# Patient Record
Sex: FEMALE | Race: WHITE | NOT HISPANIC OR LATINO | URBAN - METROPOLITAN AREA
[De-identification: names, ages, dates, MRNs, and addresses within clinical notes are randomized per-mention and may not be internally consistent; named-entity substitution may affect disease eponyms.]

---

## 2017-02-02 ENCOUNTER — EMERGENCY (EMERGENCY)
Facility: HOSPITAL | Age: 77
LOS: 1 days | Discharge: HOME | End: 2017-02-02
Attending: EMERGENCY MEDICINE | Admitting: EMERGENCY MEDICINE
Payer: MEDICARE

## 2017-02-02 VITALS
OXYGEN SATURATION: 98 % | HEART RATE: 54 BPM | SYSTOLIC BLOOD PRESSURE: 103 MMHG | DIASTOLIC BLOOD PRESSURE: 78 MMHG | TEMPERATURE: 97 F | WEIGHT: 113.98 LBS | RESPIRATION RATE: 15 BRPM

## 2017-02-02 DIAGNOSIS — I10 ESSENTIAL (PRIMARY) HYPERTENSION: ICD-10-CM

## 2017-02-02 DIAGNOSIS — R55 SYNCOPE AND COLLAPSE: ICD-10-CM

## 2017-02-02 DIAGNOSIS — D72.829 ELEVATED WHITE BLOOD CELL COUNT, UNSPECIFIED: ICD-10-CM

## 2017-02-02 DIAGNOSIS — Z72.0 TOBACCO USE: ICD-10-CM

## 2017-02-02 DIAGNOSIS — E86.0 DEHYDRATION: ICD-10-CM

## 2017-02-02 LAB
ALBUMIN SERPL ELPH-MCNC: 3.4 G/DL — SIGNIFICANT CHANGE UP (ref 3.3–5)
ALP SERPL-CCNC: 91 U/L — SIGNIFICANT CHANGE UP (ref 40–120)
ALT FLD-CCNC: 29 U/L — SIGNIFICANT CHANGE UP (ref 12–78)
ANION GAP SERPL CALC-SCNC: 10 MMOL/L — SIGNIFICANT CHANGE UP (ref 5–17)
ANISOCYTOSIS BLD QL: SLIGHT — SIGNIFICANT CHANGE UP
APPEARANCE UR: CLEAR — SIGNIFICANT CHANGE UP
AST SERPL-CCNC: 24 U/L — SIGNIFICANT CHANGE UP (ref 15–37)
BACTERIA # UR AUTO: (no result)
BASO STIPL BLD QL SMEAR: SLIGHT — SIGNIFICANT CHANGE UP
BASOPHILS # BLD AUTO: 0.3 K/UL — HIGH (ref 0–0.2)
BILIRUB SERPL-MCNC: 0.4 MG/DL — SIGNIFICANT CHANGE UP (ref 0.2–1.2)
BILIRUB UR-MCNC: NEGATIVE — SIGNIFICANT CHANGE UP
BUN SERPL-MCNC: 40 MG/DL — HIGH (ref 7–23)
CALCIUM SERPL-MCNC: 9.1 MG/DL — SIGNIFICANT CHANGE UP (ref 8.5–10.1)
CHLORIDE SERPL-SCNC: 103 MMOL/L — SIGNIFICANT CHANGE UP (ref 96–108)
CK SERPL-CCNC: 61 U/L — SIGNIFICANT CHANGE UP (ref 26–192)
CO2 SERPL-SCNC: 23 MMOL/L — SIGNIFICANT CHANGE UP (ref 22–31)
COLOR SPEC: YELLOW — SIGNIFICANT CHANGE UP
CREAT SERPL-MCNC: 1.42 MG/DL — HIGH (ref 0.5–1.3)
DIFF PNL FLD: (no result)
EOSINOPHIL # BLD AUTO: 0.3 K/UL — SIGNIFICANT CHANGE UP (ref 0–0.5)
EOSINOPHIL NFR BLD AUTO: 1 % — SIGNIFICANT CHANGE UP (ref 0–6)
EPI CELLS # UR: SIGNIFICANT CHANGE UP
GLUCOSE SERPL-MCNC: 121 MG/DL — HIGH (ref 70–99)
GLUCOSE UR QL: NEGATIVE MG/DL — SIGNIFICANT CHANGE UP
HCT VFR BLD CALC: 39.6 % — SIGNIFICANT CHANGE UP (ref 34.5–45)
HGB BLD-MCNC: 13.3 G/DL — SIGNIFICANT CHANGE UP (ref 11.5–15.5)
KETONES UR-MCNC: NEGATIVE — SIGNIFICANT CHANGE UP
LACTATE SERPL-SCNC: 0.7 MMOL/L — SIGNIFICANT CHANGE UP (ref 0.7–2)
LEUKOCYTE ESTERASE UR-ACNC: NEGATIVE — SIGNIFICANT CHANGE UP
LYMPHOCYTES # BLD AUTO: 16 % — SIGNIFICANT CHANGE UP (ref 13–44)
LYMPHOCYTES # BLD AUTO: 2.3 K/UL — SIGNIFICANT CHANGE UP (ref 1–3.3)
MAGNESIUM SERPL-MCNC: 2.2 MG/DL — SIGNIFICANT CHANGE UP (ref 1.8–2.4)
MANUAL DIF COMMENT BLD-IMP: SIGNIFICANT CHANGE UP
MCHC RBC-ENTMCNC: 30 PG — SIGNIFICANT CHANGE UP (ref 27–34)
MCHC RBC-ENTMCNC: 33.6 GM/DL — SIGNIFICANT CHANGE UP (ref 32–36)
MCV RBC AUTO: 89.4 FL — SIGNIFICANT CHANGE UP (ref 80–100)
MONOCYTES # BLD AUTO: 1.9 K/UL — HIGH (ref 0–0.9)
MONOCYTES NFR BLD AUTO: 11 % — SIGNIFICANT CHANGE UP (ref 2–14)
NEUTROPHILS # BLD AUTO: 17 K/UL — HIGH (ref 1.8–7.4)
NEUTROPHILS NFR BLD AUTO: 70 % — SIGNIFICANT CHANGE UP (ref 43–77)
NITRITE UR-MCNC: NEGATIVE — SIGNIFICANT CHANGE UP
PH UR: 6.5 — SIGNIFICANT CHANGE UP (ref 4.8–8)
PLAT MORPH BLD: NORMAL — SIGNIFICANT CHANGE UP
PLATELET # BLD AUTO: 320 K/UL — SIGNIFICANT CHANGE UP (ref 150–400)
POIKILOCYTOSIS BLD QL AUTO: SLIGHT — SIGNIFICANT CHANGE UP
POLYCHROMASIA BLD QL SMEAR: SLIGHT — SIGNIFICANT CHANGE UP
POTASSIUM SERPL-MCNC: 3.8 MMOL/L — SIGNIFICANT CHANGE UP (ref 3.5–5.3)
POTASSIUM SERPL-SCNC: 3.8 MMOL/L — SIGNIFICANT CHANGE UP (ref 3.5–5.3)
PROT SERPL-MCNC: 7.9 GM/DL — SIGNIFICANT CHANGE UP (ref 6–8.3)
PROT UR-MCNC: NEGATIVE MG/DL — SIGNIFICANT CHANGE UP
RAPID RVP RESULT: SIGNIFICANT CHANGE UP
RBC # BLD: 4.43 M/UL — SIGNIFICANT CHANGE UP (ref 3.8–5.2)
RBC # FLD: 12.8 % — SIGNIFICANT CHANGE UP (ref 10.3–14.5)
RBC BLD AUTO: SIGNIFICANT CHANGE UP
RBC CASTS # UR COMP ASSIST: SIGNIFICANT CHANGE UP /HPF (ref 0–4)
SODIUM SERPL-SCNC: 136 MMOL/L — SIGNIFICANT CHANGE UP (ref 135–145)
SP GR SPEC: 1 — LOW (ref 1.01–1.02)
TROPONIN I SERPL-MCNC: <0.015 NG/ML — SIGNIFICANT CHANGE UP (ref 0.01–0.04)
UROBILINOGEN FLD QL: NEGATIVE MG/DL — SIGNIFICANT CHANGE UP
VARIANT LYMPHS # BLD: 2 % — SIGNIFICANT CHANGE UP (ref 0–6)
WBC # BLD: 21.7 K/UL — HIGH (ref 3.8–10.5)
WBC # FLD AUTO: 21.7 K/UL — HIGH (ref 3.8–10.5)
WBC UR QL: NEGATIVE — SIGNIFICANT CHANGE UP

## 2017-02-02 PROCEDURE — 99285 EMERGENCY DEPT VISIT HI MDM: CPT

## 2017-02-02 PROCEDURE — 71010: CPT | Mod: 26

## 2017-02-02 RX ORDER — SODIUM CHLORIDE 9 MG/ML
1000 INJECTION INTRAMUSCULAR; INTRAVENOUS; SUBCUTANEOUS ONCE
Qty: 0 | Refills: 0 | Status: DISCONTINUED | OUTPATIENT
Start: 2017-02-02 | End: 2017-02-06

## 2017-02-02 RX ORDER — AZITHROMYCIN 500 MG/1
1 TABLET, FILM COATED ORAL
Qty: 5 | Refills: 0
Start: 2017-02-02 | End: 2017-02-07

## 2017-02-02 RX ORDER — SODIUM CHLORIDE 9 MG/ML
1000 INJECTION INTRAMUSCULAR; INTRAVENOUS; SUBCUTANEOUS ONCE
Qty: 0 | Refills: 0 | Status: COMPLETED | OUTPATIENT
Start: 2017-02-02 | End: 2017-02-02

## 2017-02-02 RX ADMIN — SODIUM CHLORIDE 1000 MILLILITER(S): 9 INJECTION INTRAMUSCULAR; INTRAVENOUS; SUBCUTANEOUS at 17:20

## 2017-02-02 NOTE — ED PROVIDER NOTE - CARE PLAN
Principal Discharge DX:	Syncope, unspecified syncope type  Secondary Diagnosis:	Dehydration  Secondary Diagnosis:	Leukocytosis, unspecified type

## 2017-02-02 NOTE — ED PROVIDER NOTE - CONDUCTED A DETAILED DISCUSSION WITH PATIENT AND/OR GUARDIAN REGARDING, MDM
lab results/radiology results/need for outpatient follow-up/need to admit/return to ED if symptoms worsen, persist or questions arise

## 2017-02-02 NOTE — ED PROVIDER NOTE - DETAILS:
I, Charly Breen, performed the initial face to face bedside interview with this patient regarding history of present illness, review of symptoms and relevant past medical, social and family history.  I completed an independent physical examination.  I was the initial provider who evaluated this patient.  The history, relevant review of systems, past medical and surgical history, medical decision making, and physical examination was documented by the scribe in my presence and I attest to the accuracy of the documentation.

## 2017-02-02 NOTE — ED PROVIDER NOTE - MEDICAL DECISION MAKING DETAILS
PLAN Syncope work up, orthostatics, cxray, ekg, fluids and cardiac monitor. No chest pain or cardiac hx to suggest cardiac etiology today.

## 2017-02-02 NOTE — ED PROVIDER NOTE - PROGRESS NOTE DETAILS
Pt with jacket on and wants to be discharged.  Will not stay for more fluids and admission for syncope.  Aware of risks.  Given copy of labs.  Increase PO hydration.  Must f/u with PMD in 10-2 days for repeat labs.  TESHA - mild, dehydration, and leukocytosis Pt with jacket on and wants to be discharged.  Will not stay for more fluids and admission for syncope.  Aware of risks.  Given copy of labs.  Increase PO hydration.  Must f/u with PMD in 10-2 days for repeat labs.  TESHA - mild, dehydration, and leukocytosis.    No respiratory symptoms, more URI.  WIll give Azithro given questionable area in RLL field. Pt with jacket on and wants to be discharged.  Will not stay for more fluids and admission for syncope.  I have recommended that she stay given her syncope, mild TESHA (suggesting Dehydration) and elevated WBC count.  Pt says she will not stay and will follow up with doctor.  Competent to make this decision, Aware of risks, daughters with her and are okay taking pt home.  Given copy of labs.  Increase PO hydration.  Must f/u with PMD in 10-2 days for repeat lab.  Discussed she needs to have her WBC and kidney function rechecked...  TESHA - mild, dehydration, and leukocytosis.  WBC may be from viral uri.  CXR shows possible infiltrate so will cover with azithromycin.  MUST RETURN IF SYMPTOMS WORSEN and pt is aware of this.

## 2017-02-02 NOTE — ED PROVIDER NOTE - OBJECTIVE STATEMENT
77 y/o female pmhx of htn, hld, smoker currently on metoprolol, hydrochlorothiazide presents to ED due to a syncopal episode. Pt was at [employer] with her daughter when she began to feel hot and had a syncopal episode. As per daughter pt was unconscious, woke up and had another syncopal episode, all over the course of 1 minute. Pt denies hitting her head, shaking or seizure activity. Daughter states pt was lethargic and sleepy after th episode. Pt states she feels fine now. Positive sick contacts at home.

## 2017-02-02 NOTE — ED PROVIDER NOTE - SKIN, MLM
Skin normal color for race, warm, dry and intact. No evidence of rash. No sign of external trauma such as abrasion or laceration

## 2019-09-26 ENCOUNTER — INPATIENT (INPATIENT)
Facility: HOSPITAL | Age: 79
LOS: 0 days | Discharge: LEFT BEFORE DISCHARGE | DRG: 312 | End: 2019-09-27
Attending: INTERNAL MEDICINE | Admitting: INTERNAL MEDICINE
Payer: MEDICARE

## 2019-09-26 VITALS — WEIGHT: 115.08 LBS | HEIGHT: 63 IN

## 2019-09-26 DIAGNOSIS — R55 SYNCOPE AND COLLAPSE: ICD-10-CM

## 2019-09-26 LAB
ALBUMIN SERPL ELPH-MCNC: 3.3 G/DL — SIGNIFICANT CHANGE UP (ref 3.3–5)
ALP SERPL-CCNC: 73 U/L — SIGNIFICANT CHANGE UP (ref 40–120)
ALT FLD-CCNC: 25 U/L — SIGNIFICANT CHANGE UP (ref 12–78)
ANION GAP SERPL CALC-SCNC: 12 MMOL/L — SIGNIFICANT CHANGE UP (ref 5–17)
AST SERPL-CCNC: 24 U/L — SIGNIFICANT CHANGE UP (ref 15–37)
BASOPHILS # BLD AUTO: 0.06 K/UL — SIGNIFICANT CHANGE UP (ref 0–0.2)
BASOPHILS NFR BLD AUTO: 0.5 % — SIGNIFICANT CHANGE UP (ref 0–2)
BILIRUB SERPL-MCNC: 0.4 MG/DL — SIGNIFICANT CHANGE UP (ref 0.2–1.2)
BUN SERPL-MCNC: 33 MG/DL — HIGH (ref 7–23)
CALCIUM SERPL-MCNC: 9 MG/DL — SIGNIFICANT CHANGE UP (ref 8.5–10.1)
CHLORIDE SERPL-SCNC: 106 MMOL/L — SIGNIFICANT CHANGE UP (ref 96–108)
CO2 SERPL-SCNC: 22 MMOL/L — SIGNIFICANT CHANGE UP (ref 22–31)
CREAT SERPL-MCNC: 1.49 MG/DL — HIGH (ref 0.5–1.3)
EOSINOPHIL # BLD AUTO: 0.4 K/UL — SIGNIFICANT CHANGE UP (ref 0–0.5)
EOSINOPHIL NFR BLD AUTO: 3.5 % — SIGNIFICANT CHANGE UP (ref 0–6)
GLUCOSE SERPL-MCNC: 136 MG/DL — HIGH (ref 70–99)
HCT VFR BLD CALC: 38.8 % — SIGNIFICANT CHANGE UP (ref 34.5–45)
HGB BLD-MCNC: 12.9 G/DL — SIGNIFICANT CHANGE UP (ref 11.5–15.5)
IMM GRANULOCYTES NFR BLD AUTO: 0.3 % — SIGNIFICANT CHANGE UP (ref 0–1.5)
LYMPHOCYTES # BLD AUTO: 3.85 K/UL — HIGH (ref 1–3.3)
LYMPHOCYTES # BLD AUTO: 33.4 % — SIGNIFICANT CHANGE UP (ref 13–44)
MCHC RBC-ENTMCNC: 29.7 PG — SIGNIFICANT CHANGE UP (ref 27–34)
MCHC RBC-ENTMCNC: 33.2 GM/DL — SIGNIFICANT CHANGE UP (ref 32–36)
MCV RBC AUTO: 89.4 FL — SIGNIFICANT CHANGE UP (ref 80–100)
MONOCYTES # BLD AUTO: 1.08 K/UL — HIGH (ref 0–0.9)
MONOCYTES NFR BLD AUTO: 9.4 % — SIGNIFICANT CHANGE UP (ref 2–14)
NEUTROPHILS # BLD AUTO: 6.12 K/UL — SIGNIFICANT CHANGE UP (ref 1.8–7.4)
NEUTROPHILS NFR BLD AUTO: 52.9 % — SIGNIFICANT CHANGE UP (ref 43–77)
PLATELET # BLD AUTO: 268 K/UL — SIGNIFICANT CHANGE UP (ref 150–400)
POTASSIUM SERPL-MCNC: 3.5 MMOL/L — SIGNIFICANT CHANGE UP (ref 3.5–5.3)
POTASSIUM SERPL-SCNC: 3.5 MMOL/L — SIGNIFICANT CHANGE UP (ref 3.5–5.3)
PROT SERPL-MCNC: 7.3 GM/DL — SIGNIFICANT CHANGE UP (ref 6–8.3)
RBC # BLD: 4.34 M/UL — SIGNIFICANT CHANGE UP (ref 3.8–5.2)
RBC # FLD: 13.8 % — SIGNIFICANT CHANGE UP (ref 10.3–14.5)
SODIUM SERPL-SCNC: 140 MMOL/L — SIGNIFICANT CHANGE UP (ref 135–145)
WBC # BLD: 11.54 K/UL — HIGH (ref 3.8–10.5)
WBC # FLD AUTO: 11.54 K/UL — HIGH (ref 3.8–10.5)

## 2019-09-26 PROCEDURE — 80048 BASIC METABOLIC PNL TOTAL CA: CPT

## 2019-09-26 PROCEDURE — 71045 X-RAY EXAM CHEST 1 VIEW: CPT | Mod: 26

## 2019-09-26 PROCEDURE — 95819 EEG AWAKE AND ASLEEP: CPT

## 2019-09-26 PROCEDURE — 93010 ELECTROCARDIOGRAM REPORT: CPT

## 2019-09-26 PROCEDURE — 84484 ASSAY OF TROPONIN QUANT: CPT

## 2019-09-26 PROCEDURE — 36415 COLL VENOUS BLD VENIPUNCTURE: CPT

## 2019-09-26 PROCEDURE — 85027 COMPLETE CBC AUTOMATED: CPT

## 2019-09-26 PROCEDURE — 93312 ECHO TRANSESOPHAGEAL: CPT

## 2019-09-26 RX ORDER — BENAZEPRIL HYDROCHLORIDE 40 MG/1
0 TABLET ORAL
Qty: 90 | Refills: 0 | DISCHARGE

## 2019-09-26 RX ORDER — SODIUM CHLORIDE 9 MG/ML
1000 INJECTION INTRAMUSCULAR; INTRAVENOUS; SUBCUTANEOUS ONCE
Refills: 0 | Status: COMPLETED | OUTPATIENT
Start: 2019-09-26 | End: 2019-09-26

## 2019-09-26 RX ORDER — LABETALOL HCL 100 MG
0 TABLET ORAL
Qty: 360 | Refills: 0 | DISCHARGE

## 2019-09-26 RX ORDER — AMLODIPINE BESYLATE 2.5 MG/1
0 TABLET ORAL
Qty: 90 | Refills: 0 | DISCHARGE

## 2019-09-26 RX ORDER — CHLORTHALIDONE 50 MG
0 TABLET ORAL
Qty: 90 | Refills: 0 | DISCHARGE

## 2019-09-26 RX ADMIN — SODIUM CHLORIDE 1000 MILLILITER(S): 9 INJECTION INTRAMUSCULAR; INTRAVENOUS; SUBCUTANEOUS at 20:56

## 2019-09-26 NOTE — H&P ADULT - ATTENDING COMMENTS
78 year old female patient with transient changes in mentation    -Admit to Telemetry    #Syncope  DDX: vasovagal, orthostatic, arrhythmia, mechanical, neurogenic, medication induced  -monitor on telemetry  -IVF  -will get morning echo  -will get ct head to rule out intracranial pathology  -cardiology consult    # Concern for seizure   -family members witnessed shaking followed by bowel and bladder incontinence  -will get EEG  -neuro consult  -f/u am electrolytes     #Leukocytosis  -likely reactive  -no focal signs of infection  -trend cbc  #TESHA  -unknown baseline  -acute vs acute on chronic  -will get kidney sono  -gentle hydration  #HTN  -stable  -resume home meds  #HLD  -outpatient follow up with PCP    #Advanced Directives  -full code    #DVT ppx  -	ambulation

## 2019-09-26 NOTE — H&P ADULT - NSHPLABSRESULTS_GEN_ALL_CORE
12.9   11.54 )-----------( 268      ( 26 Sep 2019 20:37 )             38.8     26 Sep 2019 20:37    140    |  106    |  33     ----------------------------<  136    3.5     |  22     |  1.49     Ca    9.0        26 Sep 2019 20:37    TPro  7.3    /  Alb  3.3    /  TBili  0.4    /  DBili  x      /  AST  24     /  ALT  25     /  AlkPhos  73     26 Sep 2019 20:37    LIVER FUNCTIONS - ( 26 Sep 2019 20:37 )  Alb: 3.3 g/dL / Pro: 7.3 gm/dL / ALK PHOS: 73 U/L / ALT: 25 U/L / AST: 24 U/L / GGT: x             CAPILLARY BLOOD GLUCOSE        CARDIAC MARKERS ( 26 Sep 2019 20:37 )  <0.015 ng/mL / x     / x     / x     / x          RADIOLOGY

## 2019-09-26 NOTE — ED ADULT NURSE NOTE - OBJECTIVE STATEMENT
pt presents to ED with complaints of AMS and bradycardia. per family, pts medications were changed yesterday by out pt MD. pt started new medications today and took 2 doses per instructions. family called EMS and upon arrival, pt was found to be lethargic, pale, altered and hypotensive. 18 g in place to left AC from EMs. 18 g placed to left FA. labs sent. EKG performed. tele monitoring initiated.

## 2019-09-26 NOTE — H&P ADULT - NSHPREVIEWOFSYSTEMS_GEN_ALL_CORE
REVIEW OF SYSTEMS:  CONSTITUTIONAL: No weakness, fevers or chills  EYES/ENT: No visual changes, no sore throat/ sick contacts  RESPIRATORY: No cough,  No shortness of breath, no sniffling  CARDIOVASCULAR: No chest pain or palpitations  GASTROINTESTINAL: No abdominal or epigastric pain. No nausea, vomiting, or hematemesis; No diarrhea or constipation. No melena or hematochezia.  GENITOURINARY: No dysuria  NEUROLOGICAL: No numbness or weakness  SKIN: No itching, burning, rashes, or lesions   All other review of systems is negative unless indicated above

## 2019-09-26 NOTE — H&P ADULT - HISTORY OF PRESENT ILLNESS
78F w/hx of HTN, CKD, presents to [facility] via EMS with complaints of passing out.  Per pt and her family, 30 minutes after taking her Labetalol she experienced weakness, LOS, shaking, and loss of bladder/bowel function. Pt then came to and then lost consciousness again. When she came to for the second time, she seemed confused. EMS was called. Pt after her LOC had BP 60/30 and HR of 40. Denies any presyncope feelings of lightheadedness, CP/palpitations, HA, changes in vision, SOB, flushing.     Of note, pt visited nephrology for the first time yesterday and had changes in medication. She d/c her HCTZ 50mg qd and Metoprolol tartrate 25mg QID. She was then started on Chlorthalidone 25mg qd and Labetalol 200mg 2tabs BID. Pt states scheduled for renal sono at the end fo the month.     In the ED, pt received 1L bolus, blood work significant for leukocytosis. Cr =1.49. EGFR of 33

## 2019-09-26 NOTE — H&P ADULT - NSHPPHYSICALEXAM_GEN_ALL_CORE
Vital Signs Last 24 Hrs  T(C): 36.8 (26 Sep 2019 20:41), Max: 36.8 (26 Sep 2019 20:41)  T(F): 98.2 (26 Sep 2019 20:41), Max: 98.2 (26 Sep 2019 20:41)  HR: 62 (26 Sep 2019 21:00) (61 - 66)  BP: 137/68 (26 Sep 2019 21:00) (127/37 - 138/113)  RR: 18 (26 Sep 2019 21:00) (18 - 18)  SpO2: 100% (26 Sep 2019 21:00) (100% - 100%)    PHYSICAL EXAM:  Constitutional: NAD, awake and alert, well-developed  HEENT: Normal Hearing, MMM, ?Amblyopia  Neck: Soft and supple, No LAD  Respiratory: Breath sounds are clear bilaterally  Cardiovascular: S1 and S2, regular rate and rhythm  Gastrointestinal: Bowel Sounds present, soft, nontender, nondistended, no guarding, no rebound  Extremities: No peripheral edema  Vascular: 2+ peripheral pulses  Neurological: A/O x 3, no focal deficits  Musculoskeletal: 5/5 strength b/l upper and lower extremities  Skin: Seborrheic keratosis scattered across flank

## 2019-09-26 NOTE — ED PROVIDER NOTE - CLINICAL SUMMARY MEDICAL DECISION MAKING FREE TEXT BOX
77 y/o female with hypotension, new change in medications. Likely medication related. Pt now improved, at baseline. R/o cardiac etiology, EKG, labs.

## 2019-09-26 NOTE — ED PROVIDER NOTE - NS_ ATTENDINGSCRIBEDETAILS _ED_A_ED_FT
Meghna Marcus MD: The history, relevant review of systems, past medical and surgical history, medical decision making, and physical examination was documented by the scribe in my presence and I attest to the accuracy of the documentation.

## 2019-09-26 NOTE — H&P ADULT - ASSESSMENT
78F w/hx of HTN, CKD, presents to [facility] via EMS with complaints of passing out.  Pt w/symptoms concerning for seizure. 78F w/hx of HTN, CKD, presents to [facility] via EMS with complaints of passing out.  Pt w/symptoms concerning for seizure although with recent changes in medication, could attributed to hypoperfusion in the setting of hypotension.    #LOC: syncope vs seizure  - Admit to tele 78F w/hx of HTN, CKD, presents to [facility] via EMS with complaints of passing out.  Pt w/symptoms concerning for seizure although with recent changes in medication, could attributed to hypoperfusion in the setting of hypotension.    #LOC: syncope vs seizure  - Admit to tele  - Serum mag/phos, TSH w/reflex T4  - UA/UTox  - CT head  - Neurology consult: EEG  - ECHO in a.m.  - Cardiology consult  - Seizure/fall precautions  - Since this is pt's first seizure, no AED at this time    #TESHA on CKD : baseline unknown. Last cr 2/2017 1.42  - Gentle hydration  - Hold nephrotoxic drugs    #Leukocytosis  - Could be reactionary, pt is afebrile. Will continue to trend WBC    #HTN  - DASH/TLC Diet  - Continue Amlodipine w/parameters  - Hold Benazepril, Chlorthalidone in the setting of TESHA  - Trend BP    #Current Everyday Smoker  - Nicotine patch  - Counseling provided. Pt adamant about not quitting      #Prophylactic Measure  - SCDs    IMPROVE VTE Individual Risk Assessment    RISK                                                                Points    [  ] Previous VTE                                                  3    [  ] Thrombophilia                                               2    [  ] Lower limb paralysis                                      2        (unable to hold up >15 seconds)      [  ] Current Cancer                                              2         (within 6 months)    [  ] Immobilization > 24 hrs                                1    [  ] ICU/CCU stay > 24 hours                              1    [x  ] Age > 60                                                      1    IMPROVE VTE Score _____1____    IMPROVE Score 0-1: Low Risk, No VTE prophylaxis required for most patients, encourage ambulation.   IMPROVE Score 2-3: At risk, pharmacologic VTE prophylaxis is indicated for most patients (in the absence of a contraindication)  IMPROVE Score > or = 4: High Risk, pharmacologic VTE prophylaxis is indicated for most patients (in the absence of a contraindication) 78F w/hx of HTN, CKD, presents to [facility] via EMS with complaints of passing out.  Pt w/symptoms concerning for seizure although with recent changes in medication, could attributed to hypoperfusion in the setting of hypotension.    #LOC: syncope vs seizure  - Admit to tele  - Serum mag/phos, TSH w/reflex T4  - UA/UTox  - CT head  - Neurology consult: EEG  - ECHO in a.m.  - Cardiology consult  - Seizure/fall precautions  - Since this is pt's first seizure, no AED at this time    #TESHA on CKD : baseline unknown. Last cr 2/2017 1.42  - Gentle hydration  - Hold nephrotoxic drugs  -get kidney sono    #Leukocytosis  - Could be reactionary, pt is afebrile. Will continue to trend WBC    #HTN  - DASH/TLC Diet  - Continue Amlodipine w/parameters  - Hold Benazepril, Chlorthalidone in the setting of TESHA  - Trend BP    #Current Everyday Smoker  - Nicotine patch  - Counseling provided. Pt adamant about not quitting      #Prophylactic Measure  - SCDs    IMPROVE VTE Individual Risk Assessment    RISK                                                                Points    [  ] Previous VTE                                                  3    [  ] Thrombophilia                                               2    [  ] Lower limb paralysis                                      2        (unable to hold up >15 seconds)      [  ] Current Cancer                                              2         (within 6 months)    [  ] Immobilization > 24 hrs                                1    [  ] ICU/CCU stay > 24 hours                              1    [x  ] Age > 60                                                      1    IMPROVE VTE Score _____1____    IMPROVE Score 0-1: Low Risk, No VTE prophylaxis required for most patients, encourage ambulation.   IMPROVE Score 2-3: At risk, pharmacologic VTE prophylaxis is indicated for most patients (in the absence of a contraindication)  IMPROVE Score > or = 4: High Risk, pharmacologic VTE prophylaxis is indicated for most patients (in the absence of a contraindication) 78F w/hx of HTN, CKD, presents to [facility] via EMS with complaints of passing out.  Pt w/symptoms concerning for seizure although with recent changes in medication, could attributed to hypoperfusion in the setting of hypotension.    #LOC: syncope vs seizure  ##Syncope  - Admit to tele  - Orthostatics  - ECHO in a.m.  - Cardiology consult    ##Seizure  - Serum mag/phos, TSH w/reflex T4  - UA/UTox  - CT head  - Neurology consult: EEG  - Seizure/fall precautions  - Since this is pt's first seizure-like activity, no AED at this time    #TESHA on CKD : baseline unknown. Last cr 2/2017 1.42  - Gentle hydration  - Recommend hold nephrotoxic drugs  - get Renal sono    #Leukocytosis  - Could be reactive- pt is afebrile. Will continue to trend WBC    #HTN  - DASH/TLC Diet  - Continue Amlodipine w/parameters  - Hold Benazepril, Chlorthalidone in the setting of TESHA  - Trend BP    #Current Everyday Smoker  - Nicotine patch  - Counseling provided. Pt adamant about not quitting      #Prophylactic Measure  - SCDs    IMPROVE VTE Individual Risk Assessment    RISK                                                                Points    [  ] Previous VTE                                                  3    [  ] Thrombophilia                                               2    [  ] Lower limb paralysis                                      2        (unable to hold up >15 seconds)      [  ] Current Cancer                                              2         (within 6 months)    [  ] Immobilization > 24 hrs                                1    [  ] ICU/CCU stay > 24 hours                              1    [x  ] Age > 60                                                      1    IMPROVE VTE Score _____1____    IMPROVE Score 0-1: Low Risk, No VTE prophylaxis required for most patients, encourage ambulation.   IMPROVE Score 2-3: At risk, pharmacologic VTE prophylaxis is indicated for most patients (in the absence of a contraindication)  IMPROVE Score > or = 4: High Risk, pharmacologic VTE prophylaxis is indicated for most patients (in the absence of a contraindication) 78F w/hx of HTN, CKD, presents to [facility] via EMS with complaints of passing out.  Pt w/symptoms concerning for seizure although with recent changes in medication, could attributed to hypoperfusion in the setting of hypotension.    #LOC: syncope vs seizure  ##Syncope  - Admit to tele  - Orthostatics  - ECHO in a.m.  - Cardiology consult    ##Seizure  - Serum mag/phos, TSH w/reflex T4  - UA/UTox  - CT head  - Neurology consult: EEG  - Seizure/fall precautions  - Since this is pt's first seizure-like activity, no AED at this time    #TESHA on CKD : baseline unknown. Last cr 2/2017 1.42  - Gentle hydration  - Recommend hold nephrotoxic drugs  - get Renal sono    #Leukocytosis  - Could be reactive- pt is afebrile. Will continue to trend WBC    #HTN  - DASH/TLC Diet  - Continue Home meds  - Trend BP    #Current Everyday Smoker  - Nicotine patch  - Counseling provided. Pt adamant about not quitting      #Prophylactic Measure  - SCDs    IMPROVE VTE Individual Risk Assessment    RISK                                                                Points    [  ] Previous VTE                                                  3    [  ] Thrombophilia                                               2    [  ] Lower limb paralysis                                      2        (unable to hold up >15 seconds)      [  ] Current Cancer                                              2         (within 6 months)    [  ] Immobilization > 24 hrs                                1    [  ] ICU/CCU stay > 24 hours                              1    [x  ] Age > 60                                                      1    IMPROVE VTE Score _____1____    IMPROVE Score 0-1: Low Risk, No VTE prophylaxis required for most patients, encourage ambulation.   IMPROVE Score 2-3: At risk, pharmacologic VTE prophylaxis is indicated for most patients (in the absence of a contraindication)  IMPROVE Score > or = 4: High Risk, pharmacologic VTE prophylaxis is indicated for most patients (in the absence of a contraindication) 78F w/hx of HTN, CKD, presents to [facility] via EMS with complaints of passing out.  Pt w/symptoms concerning for seizure although with recent changes in medication, could attributed to hypoperfusion in the setting of hypotension.    #LOC: syncope vs seizure  ##Syncope  - Admit to tele  - Orthostatics  - ECHO in a.m.  - Cardiology consult    ##Seizure  - Serum mag/phos, TSH w/reflex T4  - UA/UTox  - CT head  - Neurology consult: EEG  - Seizure/fall precautions  - Since this is pt's first seizure-like activity, no AED at this time    #TESHA on CKD : baseline unknown. Last cr 2/2017 1.42  - Gentle hydration  - Recommend hold nephrotoxic drugs  - get Renal sono    #Leukocytosis  - Could be reactive- pt is afebrile. Will continue to trend WBC    #HTN  - DASH/TLC Diet  - Continue Home meds- recommend holding nephrotoxic drugs  - Trend BP    #Current Everyday Smoker  - Nicotine patch  - Counseling provided. Pt adamant about not quitting      #Prophylactic Measure  - SCDs    IMPROVE VTE Individual Risk Assessment    RISK                                                                Points    [  ] Previous VTE                                                  3    [  ] Thrombophilia                                               2    [  ] Lower limb paralysis                                      2        (unable to hold up >15 seconds)      [  ] Current Cancer                                              2         (within 6 months)    [  ] Immobilization > 24 hrs                                1    [  ] ICU/CCU stay > 24 hours                              1    [x  ] Age > 60                                                      1    IMPROVE VTE Score _____1____    IMPROVE Score 0-1: Low Risk, No VTE prophylaxis required for most patients, encourage ambulation.   IMPROVE Score 2-3: At risk, pharmacologic VTE prophylaxis is indicated for most patients (in the absence of a contraindication)  IMPROVE Score > or = 4: High Risk, pharmacologic VTE prophylaxis is indicated for most patients (in the absence of a contraindication)

## 2019-09-26 NOTE — ED PROVIDER NOTE - OBJECTIVE STATEMENT
77 y/o female with PMHx of HTN presents to the ED regarding AMS and bradycardia this evening. Per pt, her nephrologist switched her from Metoprolol to Labetalol yesterday. Yesterday, pt finished her last dose of Metoprolol and today at 6PM, she picked up her Labetalol and took 2 doses as per the instructions. When EMS arrived at pt's home this evening, they found pt slumped over in her chair, pale, altered, and pt's BP was in the 60s/40s and was bradycardic. When EMS laid pt flat, pt's BP and HR went back up. Denies chest pain. Cannot remember nephrologist' s name but he is located in [place].

## 2019-09-27 VITALS — SYSTOLIC BLOOD PRESSURE: 110 MMHG | HEART RATE: 55 BPM | RESPIRATION RATE: 15 BRPM | DIASTOLIC BLOOD PRESSURE: 49 MMHG

## 2019-09-27 DIAGNOSIS — Z90.89 ACQUIRED ABSENCE OF OTHER ORGANS: Chronic | ICD-10-CM

## 2019-09-27 DIAGNOSIS — Z98.49 CATARACT EXTRACTION STATUS, UNSPECIFIED EYE: Chronic | ICD-10-CM

## 2019-09-27 LAB
ADD ON TEST-SPECIMEN IN LAB: SIGNIFICANT CHANGE UP
ANION GAP SERPL CALC-SCNC: 10 MMOL/L — SIGNIFICANT CHANGE UP (ref 5–17)
BUN SERPL-MCNC: 26 MG/DL — HIGH (ref 7–23)
CALCIUM SERPL-MCNC: 8.7 MG/DL — SIGNIFICANT CHANGE UP (ref 8.5–10.1)
CHLORIDE SERPL-SCNC: 107 MMOL/L — SIGNIFICANT CHANGE UP (ref 96–108)
CO2 SERPL-SCNC: 22 MMOL/L — SIGNIFICANT CHANGE UP (ref 22–31)
CREAT SERPL-MCNC: 1.04 MG/DL — SIGNIFICANT CHANGE UP (ref 0.5–1.3)
GLUCOSE SERPL-MCNC: 106 MG/DL — HIGH (ref 70–99)
HCT VFR BLD CALC: 35.4 % — SIGNIFICANT CHANGE UP (ref 34.5–45)
HGB BLD-MCNC: 11.6 G/DL — SIGNIFICANT CHANGE UP (ref 11.5–15.5)
MCHC RBC-ENTMCNC: 29.2 PG — SIGNIFICANT CHANGE UP (ref 27–34)
MCHC RBC-ENTMCNC: 32.8 GM/DL — SIGNIFICANT CHANGE UP (ref 32–36)
MCV RBC AUTO: 89.2 FL — SIGNIFICANT CHANGE UP (ref 80–100)
PLATELET # BLD AUTO: 250 K/UL — SIGNIFICANT CHANGE UP (ref 150–400)
POTASSIUM SERPL-MCNC: 3.3 MMOL/L — LOW (ref 3.5–5.3)
POTASSIUM SERPL-SCNC: 3.3 MMOL/L — LOW (ref 3.5–5.3)
RBC # BLD: 3.97 M/UL — SIGNIFICANT CHANGE UP (ref 3.8–5.2)
RBC # FLD: 13.8 % — SIGNIFICANT CHANGE UP (ref 10.3–14.5)
SODIUM SERPL-SCNC: 139 MMOL/L — SIGNIFICANT CHANGE UP (ref 135–145)
TROPONIN I SERPL-MCNC: <0.015 NG/ML — SIGNIFICANT CHANGE UP (ref 0.01–0.04)
WBC # BLD: 10.96 K/UL — HIGH (ref 3.8–10.5)
WBC # FLD AUTO: 10.96 K/UL — HIGH (ref 3.8–10.5)

## 2019-09-27 PROCEDURE — 95819 EEG AWAKE AND ASLEEP: CPT | Mod: 26

## 2019-09-27 PROCEDURE — 99223 1ST HOSP IP/OBS HIGH 75: CPT

## 2019-09-27 RX ORDER — LISINOPRIL 2.5 MG/1
40 TABLET ORAL DAILY
Refills: 0 | Status: DISCONTINUED | OUTPATIENT
Start: 2019-09-27 | End: 2019-09-27

## 2019-09-27 RX ORDER — SODIUM CHLORIDE 9 MG/ML
1000 INJECTION INTRAMUSCULAR; INTRAVENOUS; SUBCUTANEOUS
Refills: 0 | Status: DISCONTINUED | OUTPATIENT
Start: 2019-09-27 | End: 2019-09-27

## 2019-09-27 RX ORDER — AMLODIPINE BESYLATE 2.5 MG/1
10 TABLET ORAL DAILY
Refills: 0 | Status: DISCONTINUED | OUTPATIENT
Start: 2019-09-27 | End: 2019-09-27

## 2019-09-27 RX ORDER — NICOTINE POLACRILEX 2 MG
1 GUM BUCCAL DAILY
Refills: 0 | Status: DISCONTINUED | OUTPATIENT
Start: 2019-09-27 | End: 2019-09-27

## 2019-09-27 RX ORDER — INFLUENZA VIRUS VACCINE 15; 15; 15; 15 UG/.5ML; UG/.5ML; UG/.5ML; UG/.5ML
0.5 SUSPENSION INTRAMUSCULAR ONCE
Refills: 0 | Status: DISCONTINUED | OUTPATIENT
Start: 2019-09-27 | End: 2019-09-27

## 2019-09-27 RX ORDER — POTASSIUM CHLORIDE 20 MEQ
20 PACKET (EA) ORAL
Refills: 0 | Status: DISCONTINUED | OUTPATIENT
Start: 2019-09-27 | End: 2019-09-27

## 2019-09-27 RX ORDER — HYDROCHLOROTHIAZIDE 25 MG
50 TABLET ORAL DAILY
Refills: 0 | Status: DISCONTINUED | OUTPATIENT
Start: 2019-09-27 | End: 2019-09-27

## 2019-09-27 RX ORDER — LABETALOL HCL 100 MG
400 TABLET ORAL
Refills: 0 | Status: DISCONTINUED | OUTPATIENT
Start: 2019-09-27 | End: 2019-09-27

## 2019-09-27 RX ORDER — METOPROLOL TARTRATE 50 MG
100 TABLET ORAL
Refills: 0 | Status: DISCONTINUED | OUTPATIENT
Start: 2019-09-27 | End: 2019-09-27

## 2019-09-27 RX ORDER — CHLORTHALIDONE 50 MG
25 TABLET ORAL DAILY
Refills: 0 | Status: DISCONTINUED | OUTPATIENT
Start: 2019-09-27 | End: 2019-09-27

## 2019-09-27 RX ADMIN — Medication 20 MILLIEQUIVALENT(S): at 12:34

## 2019-09-27 RX ADMIN — Medication 25 MILLIGRAM(S): at 10:53

## 2019-09-27 RX ADMIN — Medication 100 MILLIGRAM(S): at 10:55

## 2019-09-27 RX ADMIN — Medication 20 MILLIEQUIVALENT(S): at 10:54

## 2019-09-27 RX ADMIN — LISINOPRIL 40 MILLIGRAM(S): 2.5 TABLET ORAL at 10:54

## 2019-09-27 RX ADMIN — AMLODIPINE BESYLATE 10 MILLIGRAM(S): 2.5 TABLET ORAL at 10:54

## 2019-09-27 NOTE — CONSULT NOTE ADULT - ASSESSMENT
78F w/hx of HTN, CKD, presents to [facility] via EMS with complaints of passing out.  Per pt and her family, 30 minutes after taking her Labetalol she experienced weakness, LOS, shaking, and loss of bladder/bowel function. Pt then came to and then lost consciousness again.    Syncope with Hypotension with Bradycardia from Medication effect  most likely.    Seizure unlikely.    Agree with EEG.    If EEG normal no other w/u needed.    Re eval her meds Labetalol she might tolerate new medication for her ? too high dose.    cardiology reconsult to adjust meds.    Adequate hydration.    Correct labs.    Follow up by On call neurology if Needed.    Check for orthostatic  BP changes.

## 2019-09-27 NOTE — CONSULT NOTE ADULT - SUBJECTIVE AND OBJECTIVE BOX
Patient is a 78y old  Female who presents with a chief complaint of syncope       HPI:  78F w/hx of HTN, CKD, presents to [facility] via EMS with complaints of passing out. Per pt and her family, 30 minutes after taking her Labetalol she experienced weakness, LOS, shaking, and loss of bladder/bowel function. Pt then came to and then lost consciousness again. When she came to for the second time, she seemed confused. EMS was called. Pt after her LOC had BP 60/30 and HR of 40. Denies any presyncope feelings of lightheadedness, CP/palpitations, HA, changes in vision, SOB, flushing.  Of note, pt visited nephrology for the first time yesterday and had changes in medication. She d/c her HCTZ 50mg qd and Metoprolol tartrate 25mg QID. She was then started on Chlorthalidone 25mg qd and Labetalol 200mg 2tabs BID. Pt states scheduled for renal sono at the end fo the month. Pt states that medication was too much and she felt she was loosing her mind before passing out. She feels fine now.       PAST MEDICAL & SURGICAL HISTORY:  HTN (hypertension)  S/P cataract surgery  S/P tonsillectomy      FAMILY HISTORY:  FH: heart attack  FH: heart disease      Social Hx:  Nonsmoker, no drug or alcohol use    MEDICATIONS  (STANDING):  amLODIPine   Tablet 10 milliGRAM(s) Oral daily  chlorthalidone 25 milliGRAM(s) Oral daily  labetalol 400 milliGRAM(s) Oral two times a day  lisinopril 40 milliGRAM(s) Oral daily  nicotine -  14 mG/24Hr(s) Patch 1 patch Transdermal daily  potassium chloride    Tablet ER 20 milliEquivalent(s) Oral every 2 hours  sodium chloride 0.9%. 1000 milliLiter(s) (50 mL/Hr) IV Continuous <Continuous>       Allergies    No Known Allergies    ROS: Pertinent positives in HPI, all other ROS were reviewed and are negative.      Vital Signs Last 24 Hrs  T(C): 36.3 (27 Sep 2019 06:57), Max: 36.9 (27 Sep 2019 00:43)  T(F): 97.4 (27 Sep 2019 06:57), Max: 98.4 (27 Sep 2019 00:43)  HR: 70 (27 Sep 2019 06:57) (61 - 70)  BP: 175/54 (27 Sep 2019 06:57) (127/37 - 175/54)  BP(mean): --  RR: 18 (27 Sep 2019 06:57) (17 - 18)  SpO2: 94% (27 Sep 2019 06:57) (94% - 100%)        Constitutional: awake and alert.  HEENT: PERRLA,  angel espotropia rt eye   Neck: Supple.  Respiratory: Breath sounds are clear bilaterally  Cardiovascular: S1 and S2, regular  rhythm  Gastrointestinal: soft, nontender  Extremities:  no edema  Vascular: Carotid Bruit - no  Musculoskeletal: no joint swelling/tenderness, no abnormal movements  Skin: No rashes    Neurological exam:  HF: A x O x 3. Appropriately interactive, normal affect. Speech fluent, No Aphasia  CN: LITO, Rt eye Angel esotropia VFF, facial sensation normal, no NLFD, gag intact  Motor: No pronator drift, Strength 5/5 in all 4 ext, normal bulk and tone   Sens: Intact to light touch   Reflexes: Symmetric and normal . BJ 2+, BR 2+, KJ 2+, AJ 1+, downgoing toes b/l  Coord:  No FNFA, dysmetria    Gait/Balance: Cannot test    NIHSS: 0    Labs:   09-27    139  |  107  |  26<H>  ----------------------------<  106<H>  3.3<L>   |  22  |  1.04    Ca    8.7      27 Sep 2019 06:25    TPro  7.3  /  Alb  3.3  /  TBili  0.4  /  DBili  x   /  AST  24  /  ALT  25  /  AlkPhos  73  09-26                              11.6   10.96 )-----------( 250      ( 27 Sep 2019 06:25 )             35.4

## 2019-09-27 NOTE — CHART NOTE - NSCHARTNOTEFT_GEN_A_CORE
Patient expressed wish to sign out AMA. When I arrived at bedside, pts daughter and family present, also wanting pt to leave. I explained again why she was being admitted and why it was necessary to continue monitoring her in the hospital. It was explained that, leaving before we could properly assess her status and medically clear her for discharge, would be unsafe. Risk of serious illness, including death was explained. Pt and family expressed their understanding of what was discussed, including risks of signing out AMA, but they still refused to stay. Pt received HCTZ 50mg, metoprolol 100mg, amlodipine 10mg, and benazepril 40mg this morning during admission.    I spoke with Dr. Lawrence, the covering physician for Dr. Long as he was not in the office at that time, about her admission and her wish to leave AMA. Dr. Mckenzie spoke to Dr. Echols with an update about hospitalization.    Case discussed Dr. Rivera, and Dr. Mckenzie PGY2 Patient expressed wish to sign out AMA. When I arrived at bedside, pts daughter and family present, also wanting pt to leave. I explained again why she was being admitted and why it was necessary to continue monitoring her in the hospital. It was explained that, leaving before we could properly assess her status and medically clear her for discharge, would be unsafe. Risk of serious illness, including death was explained. Pt and family expressed their understanding of what was discussed, including risks of signing out AMA, but they still refused to stay. Pt received HCTZ 50mg, metoprolol 100mg, amlodipine 10mg, and benazepril 40mg this morning during admission.    I spoke with Dr. Lawrence, the covering physician for Dr. Long as he was not in the office at that time, about her admission and her wish to leave AMA. Dr. Mckenzie spoke to Dr. Echols with an update about hospitalization.    Case discussed Dr. Rivera, and Dr. Mckenzie PGY2        As above.  Pt leaving AMA.

## 2019-09-27 NOTE — CHART NOTE - NSCHARTNOTEFT_GEN_A_CORE
Pt seen and examined with house staff.  Plan formulated and reviewed on rounds     Briefly,  77 y/o female with HTN and HL presents with syncope one hr after taking labetalol which is a new medication.  She was noted to be hypotensive and bradycardiac and had urinary/stool incont.      Awake and alert  NAD  feels well now  Hypertensive (has not had meds as of yet)    Medication induced syncope and less likely seizure  HTN    Will restart CCBx, BBx, ACEI and HCT  Neuro follow up  DVT prophy    tele monitoring

## 2019-09-27 NOTE — PROGRESS NOTE ADULT - ASSESSMENT
HPI: 78F w/hx of HTN, CKD, presents to [facility] via EMS with complaints of passing out.    #syncope induced by new meds    -discontinued new meds   - started back on old meds   - replete K+ levels   - f/u with echo       #TESHA on CKD   - resolved    - Cr- back to normal  - consulted nephro  - ordered US Kidney and bladder    - f/u outpt       #HTN   - Started old meds   - HCTZ-50mg QD   - Amlodipine 10mg QD   - lisinopril-40mg QD   - metoprolol titrate - 100mg BID      #Seizure   - Order Mg/ Phos levels   - F/u with EEG results    -f/u with neurology     #DM   - Not under control  -consider meds before discharge     #Smoking  - talked abt quitting - but pt doesn't want to.     # DVT PPX:   -SCD's     IMPROVE VTE Individual Risk Assessment    RISK                                                                Points    [  ] Previous VTE                                                  3    [  ] Thrombophilia                                               2    [  ] Lower limb paralysis                                      2        (unable to hold up >15 seconds)      [  ] Current Cancer                                              2         (within 6 months)    [  ] Immobilization > 24 hrs                                1    [  ] ICU/CCU stay > 24 hours                              1    [ x] Age > 60                                                      1    IMPROVE VTE Score ___1______    IMPROVE Score 0-1: Low Risk, No VTE prophylaxis required for most patients, encourage ambulation.   IMPROVE Score 2-3: At risk, pharmacologic VTE prophylaxis is indicated for most patients (in the absence of a contraindication)  IMPROVE Score > or = 4: High Risk, pharmacologic VTE prophylaxis is indicated for most patients (in the absence of a contraindication) HPI: 78F w/hx of HTN, CKD, presents to [facility] via EMS with complaints of passing out.    #syncope induced by new meds   -orthostatics     -discontinued new meds   - started back on old meds   - replete K+ levels   - f/u with echo       #TESHA on CKD   - resolved    - Cr- back to normal  - consulted nephro  - ordered US Kidney and bladder    - f/u outpt       #HTN   - Started old meds   - HCTZ-50mg QD   - Amlodipine 10mg QD   - lisinopril-40mg QD   - metoprolol titrate - 100mg BID      #Seizure   - Order Mg/ Phos levels   - F/u with EEG results    -f/u with neurology   -seizure/fall precautions     #DM   - Not under control  -consider meds before discharge     #Smoking  - talked abt quitting - but pt doesn't want to.     # DVT PPX:   -SCD's     IMPROVE VTE Individual Risk Assessment    RISK                                                                Points    [  ] Previous VTE                                                  3    [  ] Thrombophilia                                               2    [  ] Lower limb paralysis                                      2        (unable to hold up >15 seconds)      [  ] Current Cancer                                              2         (within 6 months)    [  ] Immobilization > 24 hrs                                1    [  ] ICU/CCU stay > 24 hours                              1    [ x] Age > 60                                                      1    IMPROVE VTE Score ___1______    IMPROVE Score 0-1: Low Risk, No VTE prophylaxis required for most patients, encourage ambulation.   IMPROVE Score 2-3: At risk, pharmacologic VTE prophylaxis is indicated for most patients (in the absence of a contraindication)  IMPROVE Score > or = 4: High Risk, pharmacologic VTE prophylaxis is indicated for most patients (in the absence of a contraindication)

## 2019-09-27 NOTE — ED ADULT NURSE REASSESSMENT NOTE - NS ED NURSE REASSESS COMMENT FT1
Pt left for echo earlier and then to EEG-plan is for patient to go from EEG to floor. Pt being boarded on stepdown as a tele patient. Report given to RN Allie. Dr. Rivera and Dr. Arriola paged over head to question Labetalol order-no call back at this time. Receiving RN made aware.

## 2019-09-27 NOTE — PROGRESS NOTE ADULT - SUBJECTIVE AND OBJECTIVE BOX
HPI: 78F w/hx of HTN, CKD, presents to [facility] via EMS with complaints of passing out.  Per pt and her family, 30 minutes after taking her Labetalol she experienced weakness, LOS, shaking, and loss of bladder/bowel function. Pt then came to and then lost consciousness again. When she came to for the second time, she seemed confused. EMS was called. Pt after her LOC had BP 60/30 and HR of 40. Denies any presyncope feelings of lightheadedness, CP/palpitations, HA, changes in vision, SOB, flushing.     Of note, pt visited nephrology for the first time yesterday and had changes in medication. She d/c her HCTZ 50mg qd and Metoprolol tartrate 25mg QID. She was then started on Chlorthalidone 25mg qd and Labetalol 200mg 2tabs BID. Pt states scheduled for renal sono at the end fo the month.     In the ED, pt received 1L bolus, blood work significant for leukocytosis. Cr =1.49. EGFR of 33    SUBJECTIVE:  Pt was evaluated this AM. Pt reported feeling much better. Pt feels a rush d/t to HTN. Never happened before and Denies any lightheadedness, diaphoresis, N/V and no urinary/fecal incontinence.      REVIEW OF SYSTEMS:  CONSTITUTIONAL: No weakness, fevers or chills  EYES/ENT: No visual changes;  No vertigo or throat pain   NECK: No pain or stiffness  RESPIRATORY: No cough, wheezing, hemoptysis; No shortness of breath  CARDIOVASCULAR: No chest pain or palpitations  GASTROINTESTINAL: No abdominal or epigastric pain. No nausea, vomiting, or hematemesis; No diarrhea or constipation. No melena or hematochezia.  GENITOURINARY: No dysuria, frequency or hematuria  NEUROLOGICAL: No numbness or weakness  SKIN: No itching, burning, rashes, or lesions   All other review of systems is negative unless indicated above    Vital Signs Last 24 Hrs  T(C): 36.3 (27 Sep 2019 06:57), Max: 36.9 (27 Sep 2019 00:43)  T(F): 97.4 (27 Sep 2019 06:57), Max: 98.4 (27 Sep 2019 00:43)  HR: 70 (27 Sep 2019 06:57) (61 - 70)  BP: 175/54 (27 Sep 2019 06:57) (127/37 - 175/54)  BP(mean): --  RR: 18 (27 Sep 2019 06:57) (17 - 18)  SpO2: 94% (27 Sep 2019 06:57) (94% - 100%)    I&O's Summary      CAPILLARY BLOOD GLUCOSE          PHYSICAL EXAM:  Constitutional: NAD, awake and alert, well-developed  HEENT: Normal Hearing, MMM, Strab[place]?/ Amblyopia?   Neck: Soft and supple, No LAD, No JVD  Respiratory: Breath sounds are clear bilaterally, No wheezing, rales or rhonchi  Cardiovascular: S1 and S2, regular rate and rhythm, no Murmurs, gallops or rubs  Gastrointestinal: Bowel Sounds present, soft, nontender, nondistended, no guarding, no rebound  Extremities: No peripheral edema  Vascular: 2+ peripheral pulses  Neurological: A/O x 3, no focal deficits  Musculoskeletal: 5/5 strength b/l upper and lower extremities      MEDICATIONS:  MEDICATIONS  (STANDING):  amLODIPine   Tablet 10 milliGRAM(s) Oral daily  hydrochlorothiazide 50 milliGRAM(s) Oral daily  influenza   Vaccine 0.5 milliLiter(s) IntraMuscular once  lisinopril 40 milliGRAM(s) Oral daily  metoprolol tartrate 100 milliGRAM(s) Oral two times a day  nicotine -  14 mG/24Hr(s) Patch 1 patch Transdermal daily  potassium chloride    Tablet ER 20 milliEquivalent(s) Oral every 2 hours  sodium chloride 0.9%. 1000 milliLiter(s) (50 mL/Hr) IV Continuous <Continuous>      LABS: All Labs Reviewed:                        11.6   10.96 )-----------( 250      ( 27 Sep 2019 06:25 )             35.4     09-27    139  |  107  |  26<H>  ----------------------------<  106<H>  3.3<L>   |  22  |  1.04    Ca    8.7      27 Sep 2019 06:25    TPro  7.3  /  Alb  3.3  /  TBili  0.4  /  DBili  x   /  AST  24  /  ALT  25  /  AlkPhos  73  09-26      CARDIAC MARKERS ( 26 Sep 2019 23:35 )  <0.015 ng/mL / x     / x     / x     / x      CARDIAC MARKERS ( 26 Sep 2019 20:37 )  <0.015 ng/mL / x     / x     / x     / x          RADIOLOGY  < from: Xray Chest 1 View-PORTABLE IMMEDIATE  EXAM:  XR CHEST PORTABLE IMMED 1V                            PROCEDURE DATE:  09/26/2019          INTERPRETATION:  Exam Date: 9/26/2019 9:16 PM    History: Admission    Technique: Single frontal portable view of the chest with comparison to    2/2/2017    Findings:    The heart is mildly enlarged.. Left upper lobe nodular opacity, CT chest   for further evaluation.. The apices and hemidiaphragms are unremarkable.   Degenerative changes of the visualized osseous structures.        Impression:    Left upper lobe nodular opacity, CT chest for further evaluation    A callback form was submitted.      EKG   < from: 12 Lead ECG (09.26.19 @ 20:42) >    Ventricular Rate 63 BPM    Atrial Rate 63 BPM    P-R Interval 152 ms    QRS Duration 76 ms    Q-T Interval 440 ms    QTC Calculation(Bezet) 450 ms    P Axis 74 degrees    R Axis 73 degrees    T Axis 69 degrees    Diagnosis Line Normal sinus rhythm  Normal ECG  No previous ECGs available  Confirmed by ALEXANDRIA KAY, DHAVAL ([id number]) on 9/27/2019 11:59:09 AM

## 2019-10-04 DIAGNOSIS — I95.9 HYPOTENSION, UNSPECIFIED: ICD-10-CM

## 2019-10-04 DIAGNOSIS — E78.5 HYPERLIPIDEMIA, UNSPECIFIED: ICD-10-CM

## 2019-10-04 DIAGNOSIS — G40.909 EPILEPSY, UNSPECIFIED, NOT INTRACTABLE, WITHOUT STATUS EPILEPTICUS: ICD-10-CM

## 2019-10-04 DIAGNOSIS — R32 UNSPECIFIED URINARY INCONTINENCE: ICD-10-CM

## 2019-10-04 DIAGNOSIS — T44.8X5A ADVERSE EFFECT OF CENTRALLY-ACTING AND ADRENERGIC-NEURON-BLOCKING AGENTS, INITIAL ENCOUNTER: ICD-10-CM

## 2019-10-04 DIAGNOSIS — F17.200 NICOTINE DEPENDENCE, UNSPECIFIED, UNCOMPLICATED: ICD-10-CM

## 2019-10-04 DIAGNOSIS — R55 SYNCOPE AND COLLAPSE: ICD-10-CM

## 2019-10-04 DIAGNOSIS — N17.9 ACUTE KIDNEY FAILURE, UNSPECIFIED: ICD-10-CM

## 2019-10-04 DIAGNOSIS — R15.9 FULL INCONTINENCE OF FECES: ICD-10-CM

## 2019-10-04 DIAGNOSIS — R00.1 BRADYCARDIA, UNSPECIFIED: ICD-10-CM

## 2019-10-04 DIAGNOSIS — N18.9 CHRONIC KIDNEY DISEASE, UNSPECIFIED: ICD-10-CM

## 2019-10-04 DIAGNOSIS — D72.829 ELEVATED WHITE BLOOD CELL COUNT, UNSPECIFIED: ICD-10-CM

## 2019-10-04 DIAGNOSIS — I12.9 HYPERTENSIVE CHRONIC KIDNEY DISEASE WITH STAGE 1 THROUGH STAGE 4 CHRONIC KIDNEY DISEASE, OR UNSPECIFIED CHRONIC KIDNEY DISEASE: ICD-10-CM

## 2019-10-04 DIAGNOSIS — E11.22 TYPE 2 DIABETES MELLITUS WITH DIABETIC CHRONIC KIDNEY DISEASE: ICD-10-CM

## 2019-10-04 PROBLEM — I10 ESSENTIAL (PRIMARY) HYPERTENSION: Chronic | Status: ACTIVE | Noted: 2019-09-27

## 2019-10-04 PROBLEM — Z00.00 ENCOUNTER FOR PREVENTIVE HEALTH EXAMINATION: Status: ACTIVE | Noted: 2019-10-04

## 2019-10-07 ENCOUNTER — APPOINTMENT (OUTPATIENT)
Dept: INFECTIOUS DISEASE | Facility: CLINIC | Age: 79
End: 2019-10-07

## 2021-05-12 NOTE — ED PROVIDER NOTE - TOBACCO USE
"Chief Complaint   Patient presents with   • Back Pain     Mid back pain for past month. Was lifting a steel beam at work and felt a few \"pops\"   • Numbness     Intermittent numbness in left arm       Ambulatory to ED with the above complaint.     Explained wait time and triage process to pt. Pt placed back in lobby, told to notify ED tech or triage RN of any changes, verbalized understanding.    "
Current every day smoker

## 2021-07-24 ENCOUNTER — EMERGENCY (EMERGENCY)
Facility: HOSPITAL | Age: 81
LOS: 1 days | Discharge: LEFT BEFORE DISCHARGE | End: 2021-07-24
Admitting: EMERGENCY MEDICINE
Payer: MEDICARE

## 2021-07-24 VITALS
RESPIRATION RATE: 16 BRPM | WEIGHT: 111.99 LBS | HEART RATE: 63 BPM | DIASTOLIC BLOOD PRESSURE: 64 MMHG | TEMPERATURE: 97 F | OXYGEN SATURATION: 95 % | SYSTOLIC BLOOD PRESSURE: 185 MMHG | HEIGHT: 63 IN

## 2021-07-24 DIAGNOSIS — Z90.89 ACQUIRED ABSENCE OF OTHER ORGANS: Chronic | ICD-10-CM

## 2021-07-24 DIAGNOSIS — Z98.49 CATARACT EXTRACTION STATUS, UNSPECIFIED EYE: Chronic | ICD-10-CM

## 2021-07-24 PROCEDURE — L9991: CPT

## 2021-07-24 PROCEDURE — 99281 EMR DPT VST MAYX REQ PHY/QHP: CPT

## 2021-07-24 RX ORDER — LABETALOL HCL 100 MG
2 TABLET ORAL
Qty: 0 | Refills: 0 | DISCHARGE

## 2021-07-24 RX ORDER — BENAZEPRIL HYDROCHLORIDE 40 MG/1
1 TABLET ORAL
Qty: 0 | Refills: 0 | DISCHARGE

## 2021-07-24 RX ORDER — CHLORTHALIDONE 50 MG
1 TABLET ORAL
Qty: 0 | Refills: 0 | DISCHARGE

## 2021-07-24 RX ORDER — GUAIFENESIN/DEXTROMETHORPHAN 600MG-30MG
2 TABLET, EXTENDED RELEASE 12 HR ORAL
Qty: 0 | Refills: 0 | DISCHARGE

## 2021-07-24 RX ORDER — AMLODIPINE BESYLATE 2.5 MG/1
1 TABLET ORAL
Qty: 0 | Refills: 0 | DISCHARGE

## 2021-07-24 NOTE — ED ADULT TRIAGE NOTE - CHIEF COMPLAINT QUOTE
Pt with left sided rib pain - was seen at  yesterday has CXR.  Daughter was told she has a mass - pt is unaware -

## 2021-07-24 NOTE — ED ADULT NURSE REASSESSMENT NOTE - NS ED NURSE REASSESS COMMENT FT1
Spoke w/ patient regarding wanting to leave prior to being seen. Per patient and family pt has appointment monday and does not want to be in the hospital. Pt seen awake alert ambulatory and at baseline mental status w/ steady gait walking out.

## 2021-07-24 NOTE — ED ADULT NURSE NOTE - OBJECTIVE STATEMENT
Pt. received alert and oriented x4 with chief complaint of pain to left axillary area. Pt. sent in from urgent care for further evaluation post diagnosis of tumor to affected area.

## 2021-07-24 NOTE — ED ADULT TRIAGE NOTE - LOCATION:

## 2023-05-21 NOTE — ED ADULT NURSE NOTE - NSIMPLEMENTINTERV_GEN_ALL_ED
Implemented All Universal Safety Interventions:  [place] to call system. Call bell, personal items and telephone within reach. Instruct patient to call for assistance. Room bathroom lighting operational. Non-slip footwear when patient is off stretcher. Physically safe environment: no spills, clutter or unnecessary equipment. Stretcher in lowest position, wheels locked, appropriate side rails in place.
None